# Patient Record
Sex: MALE | Race: WHITE | NOT HISPANIC OR LATINO | URBAN - METROPOLITAN AREA
[De-identification: names, ages, dates, MRNs, and addresses within clinical notes are randomized per-mention and may not be internally consistent; named-entity substitution may affect disease eponyms.]

---

## 2019-02-24 ENCOUNTER — EMERGENCY (EMERGENCY)
Facility: HOSPITAL | Age: 24
LOS: 0 days | Discharge: HOME | End: 2019-02-24
Attending: EMERGENCY MEDICINE | Admitting: EMERGENCY MEDICINE

## 2019-02-24 VITALS
HEIGHT: 68 IN | DIASTOLIC BLOOD PRESSURE: 73 MMHG | TEMPERATURE: 97 F | OXYGEN SATURATION: 99 % | HEART RATE: 84 BPM | RESPIRATION RATE: 18 BRPM | SYSTOLIC BLOOD PRESSURE: 141 MMHG | WEIGHT: 214.95 LBS
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
## 2019-02-24 DIAGNOSIS — T75.89XA OTHER SPECIFIED EFFECTS OF EXTERNAL CAUSES, INITIAL ENCOUNTER: ICD-10-CM
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
## 2019-02-24 LAB
ALBUMIN SERPL ELPH-MCNC: 4.3 G/DL — SIGNIFICANT CHANGE UP (ref 3.5–5.2)
ALP SERPL-CCNC: 65 U/L — SIGNIFICANT CHANGE UP (ref 30–115)
ALT FLD-CCNC: 21 U/L — SIGNIFICANT CHANGE UP (ref 0–41)
ANION GAP SERPL CALC-SCNC: 12 MMOL/L — SIGNIFICANT CHANGE UP (ref 7–14)
AST SERPL-CCNC: 16 U/L — SIGNIFICANT CHANGE UP (ref 0–41)
BASOPHILS # BLD AUTO: 0.05 K/UL — SIGNIFICANT CHANGE UP (ref 0–0.2)
BASOPHILS NFR BLD AUTO: 0.8 % — SIGNIFICANT CHANGE UP (ref 0–1)
BILIRUB SERPL-MCNC: 0.3 MG/DL — SIGNIFICANT CHANGE UP (ref 0.2–1.2)
BUN SERPL-MCNC: 16 MG/DL — SIGNIFICANT CHANGE UP (ref 10–20)
CALCIUM SERPL-MCNC: 9.1 MG/DL — SIGNIFICANT CHANGE UP (ref 8.5–10.1)
CHLORIDE SERPL-SCNC: 111 MMOL/L — HIGH (ref 98–110)
CO2 SERPL-SCNC: 24 MMOL/L — SIGNIFICANT CHANGE UP (ref 17–32)
CREAT SERPL-MCNC: 1 MG/DL — SIGNIFICANT CHANGE UP (ref 0.7–1.5)
EOSINOPHIL # BLD AUTO: 0.15 K/UL — SIGNIFICANT CHANGE UP (ref 0–0.7)
EOSINOPHIL NFR BLD AUTO: 2.3 % — SIGNIFICANT CHANGE UP (ref 0–8)
GLUCOSE SERPL-MCNC: 145 MG/DL — HIGH (ref 70–99)
HCT VFR BLD CALC: 45.1 % — SIGNIFICANT CHANGE UP (ref 42–52)
HGB BLD-MCNC: 15.1 G/DL — SIGNIFICANT CHANGE UP (ref 14–18)
HIV 1 & 2 AB SERPL IA.RAPID: SIGNIFICANT CHANGE UP
IMM GRANULOCYTES NFR BLD AUTO: 0.3 % — SIGNIFICANT CHANGE UP (ref 0.1–0.3)
LYMPHOCYTES # BLD AUTO: 1.96 K/UL — SIGNIFICANT CHANGE UP (ref 1.2–3.4)
LYMPHOCYTES # BLD AUTO: 29.8 % — SIGNIFICANT CHANGE UP (ref 20.5–51.1)
MCHC RBC-ENTMCNC: 30.3 PG — SIGNIFICANT CHANGE UP (ref 27–31)
MCHC RBC-ENTMCNC: 33.5 G/DL — SIGNIFICANT CHANGE UP (ref 32–37)
MCV RBC AUTO: 90.4 FL — SIGNIFICANT CHANGE UP (ref 80–94)
MONOCYTES # BLD AUTO: 0.51 K/UL — SIGNIFICANT CHANGE UP (ref 0.1–0.6)
MONOCYTES NFR BLD AUTO: 7.8 % — SIGNIFICANT CHANGE UP (ref 1.7–9.3)
NEUTROPHILS # BLD AUTO: 3.89 K/UL — SIGNIFICANT CHANGE UP (ref 1.4–6.5)
NEUTROPHILS NFR BLD AUTO: 59 % — SIGNIFICANT CHANGE UP (ref 42.2–75.2)
NRBC # BLD: 0 /100 WBCS — SIGNIFICANT CHANGE UP (ref 0–0)
PLATELET # BLD AUTO: 231 K/UL — SIGNIFICANT CHANGE UP (ref 130–400)
POTASSIUM SERPL-MCNC: 4.1 MMOL/L — SIGNIFICANT CHANGE UP (ref 3.5–5)
POTASSIUM SERPL-SCNC: 4.1 MMOL/L — SIGNIFICANT CHANGE UP (ref 3.5–5)
PROT SERPL-MCNC: 6.3 G/DL — SIGNIFICANT CHANGE UP (ref 6–8)
RBC # BLD: 4.99 M/UL — SIGNIFICANT CHANGE UP (ref 4.7–6.1)
RBC # FLD: 12.5 % — SIGNIFICANT CHANGE UP (ref 11.5–14.5)
SODIUM SERPL-SCNC: 147 MMOL/L — HIGH (ref 135–146)
WBC # BLD: 6.58 K/UL — SIGNIFICANT CHANGE UP (ref 4.8–10.8)
WBC # FLD AUTO: 6.58 K/UL — SIGNIFICANT CHANGE UP (ref 4.8–10.8)
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
## 2019-02-24 NOTE — ED PROVIDER NOTE - CLINICAL SUMMARY MEDICAL DECISION MAKING FREE TEXT BOX
Patient sustained possible body fluid exposure while at work no symptoms at this time I will discharge with follow up to ID I have offered pep prophylaxis patient does not want to pursue at this time I will discharge with follow up to ID

## 2019-02-24 NOTE — ED PROVIDER NOTE - CARE PROVIDER_API CALL
Alexander Barnes)  Infectious Disease; Internal Medicine  [address] [place] [address]  Phone: [phone]  Fax: [phone]  Follow Up Time:
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
## 2019-02-25 LAB
HAV IGM SER-ACNC: SIGNIFICANT CHANGE UP
HBV CORE IGM SER-ACNC: SIGNIFICANT CHANGE UP
HBV SURFACE AG SER-ACNC: SIGNIFICANT CHANGE UP
HCV AB S/CO SERPL IA: 0.08 S/CO — SIGNIFICANT CHANGE UP (ref 0–0.79)
HCV AB SERPL-IMP: SIGNIFICANT CHANGE UP
T PALLIDUM AB TITR SER: NEGATIVE — SIGNIFICANT CHANGE UP

## 2019-09-26 NOTE — ED PROVIDER NOTE - SCRIBE NAME
How Many Skin Cancers Have You Had?: more than one What Is The Reason For Today's Visit?: History of Non-Melanoma Skin Cancer Debi Calvo

## 2020-12-21 ENCOUNTER — EMERGENCY (EMERGENCY)
Facility: HOSPITAL | Age: 25
LOS: 0 days | Discharge: HOME | End: 2020-12-21
Attending: EMERGENCY MEDICINE | Admitting: EMERGENCY MEDICINE
Payer: OTHER MISCELLANEOUS

## 2020-12-21 VITALS
WEIGHT: 240.08 LBS | HEIGHT: 68 IN | RESPIRATION RATE: 22 BRPM | SYSTOLIC BLOOD PRESSURE: 141 MMHG | TEMPERATURE: 99 F | HEART RATE: 98 BPM | DIASTOLIC BLOOD PRESSURE: 89 MMHG | OXYGEN SATURATION: 99 %
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
## 2020-12-21 DIAGNOSIS — M25.561 PAIN IN RIGHT KNEE: ICD-10-CM

## 2020-12-21 DIAGNOSIS — M25.571 PAIN IN RIGHT ANKLE AND JOINTS OF RIGHT FOOT: ICD-10-CM
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
## 2020-12-21 DIAGNOSIS — M25.569 PAIN IN UNSPECIFIED KNEE: ICD-10-CM

## 2020-12-21 PROCEDURE — 73564 X-RAY EXAM KNEE 4 OR MORE: CPT | Mod: 26,RT

## 2020-12-21 PROCEDURE — 73630 X-RAY EXAM OF FOOT: CPT | Mod: 26,RT

## 2020-12-21 PROCEDURE — 73610 X-RAY EXAM OF ANKLE: CPT | Mod: 26,RT

## 2020-12-21 PROCEDURE — 99284 EMERGENCY DEPT VISIT MOD MDM: CPT

## 2020-12-21 RX ORDER — IBUPROFEN 200 MG
600 TABLET ORAL ONCE
Refills: 0 | Status: COMPLETED | OUTPATIENT
Start: 2020-12-21 | End: 2020-12-21

## 2020-12-21 RX ADMIN — Medication 600 MILLIGRAM(S): at 21:57

## 2020-12-21 NOTE — ED PROVIDER NOTE - OBJECTIVE STATEMENT
25 y.o. male no pmh presenting s/p mechanical fall presenting with right knee and ankle pain. Pt denies, head strike Ha, neck pain , LOC, blood thinner usage. Pt states he felt tired while working 17 hr shift, slipped down 2-3 stairs. Denies back pain, saddle anesthesia, gait abnormalities. 25 y.o. male no pmh presenting s/p mechanical fall presenting with right knee and ankle pain. Pt denies, head strike Ha, neck pain , LOC, blood thinner usage. Pt states he felt tired while working at  17 hr shift ([employer]), slipped down 2-3 stairs. Fell forward onto hands and knees, twisted right ankle. Jumped up immediately as was in the presence of inmates. Received ACE wrap from [facility].  Denies back pain, saddle anesthesia, gait abnormalities.

## 2020-12-21 NOTE — ED PROVIDER NOTE - PHYSICAL EXAMINATION
Physical Exam    Vital Signs: I have reviewed the initial vital signs.  Constitutional: well-nourished, appears stated age, no acute distress  Eyes: Conjunctiva pink, Sclera clear, PERRLA, EOMI.  Cardiovascular: S1 and S2, regular rate, regular rhythm, well-perfused extremities, radial pulses equal and 2+  Respiratory: unlabored respiratory effort, clear to auscultation bilaterally no wheezing, rales and rhonchi  Gastrointestinal: soft, non-tender abdomen, no pulsatile mass, normal bowl sounds  Musculoskeletal: supple neck, no lower extremity edema, no midline tenderness, no clavicular tenderness, no chest wall tenderness, no gross bony deformities of knee, no swelling noted, negative anterior drawer sign.   Integumentary: warm, dry, no rash  Neurologic: awake, alert, cranial nerves II-XII grossly intact, extremities’ motor and sensory functions grossly intact  Psychiatric: appropriate mood, appropriate affect Physical Exam    Vital Signs: I have reviewed the initial vital signs.  Constitutional: well-nourished, appears stated age, no acute distress  Eyes: Conjunctiva pink, Sclera clear, PERRLA, EOMI.  Cardiovascular: S1 and S2, regular rate, regular rhythm, well-perfused extremities, radial pulses equal and 2+  Respiratory: unlabored respiratory effort, clear to auscultation bilaterally no wheezing, rales and rhonchi  Gastrointestinal: soft, non-tender abdomen, no pulsatile mass, normal bowl sounds  Musculoskeletal: supple neck, no lower extremity edema, no midline tenderness, no clavicular tenderness, no chest wall tenderness, no gross bony deformities of knee, no swelling noted, negative anterior/posterior drawer signs, no laxity noted, pain with varus and valgus stress, no joint line tenderness, negative Jocelyne. Pedal pulses intact b/l. TTP right lateral malleoli. No swelling or gross bautista deformities.  Integumentary: warm, dry, no rash  Neurologic: awake, alert, cranial nerves II-XII grossly intact, extremities’ motor and sensory functions grossly intact, nonataxic gait.  Psychiatric: not assessed

## 2020-12-21 NOTE — ED PROVIDER NOTE - NSFOLLOWUPINSTRUCTIONS_ED_ALL_ED_FT
Follow up with the orthopedist as soon as possible. Return to the ER if your symptoms worsen.      Knee Pain    WHAT YOU NEED TO KNOW:    What do I need to know about knee pain? Knee pain may start suddenly, or it may be a long-term problem. You may have pain on the side, front, or back of your knee. You may have knee stiffness and swelling. You may hear popping sounds or feel like your knee is giving way or locking up as you walk. You may feel pain when you sit, stand, walk, or climb up and down stairs.    What increases my risk for knee pain?     Obesity      A strain or tear in ligaments or tendons      A leg fracture or knee dislocation      Overuse of your knee      Osteoarthritis, rheumatoid arthritis, or gout      An infection, tumor, or cyst in your knee      Shoes that are not supportive, or training on a hard surface      Sports that involve jumping or pivoting on your knee    How is the cause of knee pain diagnosed? Your healthcare provider will examine your knee and ask about your symptoms. Tell your provider when the pain started and what you were doing at the time. Describe the pain, such as sharp, throbbing, or achy. Tell your provider about any knee injury or surgery you had. You may need any of the following:     MRI, CT, or ultrasound pictures may show an injury, fracture, or tumor.       Blood tests may be used to check the level of inflammation in your blood. The tests may also show signs of infection.      Arthroscopy is a procedure to look inside your knee joint with an arthroscope. An arthroscope is a flexible tube with a light and camera on the end. A knee arthroscopy is usually done to check for disease or damage inside your knee. These problems may be fixed during the procedure.    How is knee pain treated? Treatment will depend on the cause of your pain. You may need any of the following:     NSAIDs help decrease swelling and pain or fever. This medicine is available with or without a doctor's order. NSAIDs can cause stomach bleeding or kidney problems in certain people. If you take blood thinner medicine, always ask your healthcare provider if NSAIDs are safe for you. Always read the medicine label and follow directions.      Acetaminophen decreases pain and fever. It is available without a doctor's order. Ask how much to take and how often to take it. Follow directions. Read the labels of all other medicines you are using to see if they also contain acetaminophen, or ask your doctor or pharmacist. Acetaminophen can cause liver damage if not taken correctly. Do not use more than 4 grams (4,000 milligrams) total of acetaminophen in one day.       Prescription pain medicine may be given. Ask your healthcare provider how to take this medicine safely. Some prescription pain medicines contain acetaminophen. Do not take other medicines that contain acetaminophen without talking to your healthcare provider. Too much acetaminophen may cause liver damage. Prescription pain medicine may cause constipation. Ask your healthcare provider how to prevent or treat constipation.       Steroid injections may be given into your knee. Steroids reduce inflammation and pain.      Surgery may be used for some injuries, such as to repair a torn ACL.    What can I do to manage my symptoms?     Rest your knee so it can heal. Limit activities that increase your pain. Do low-impact exercises, such as walking or swimming.       Apply ice to help reduce swelling and pain. Use an ice pack, or put crushed ice in a plastic bag. Cover it with a towel before you apply it to your knee. Apply ice for 15 to 20 minutes every hour, or as directed.      Apply compression to help reduce swelling. Use a brace or bandage only as directed.      Elevate your knee to help decrease pain and swelling. Elevate your knee while you are sitting or lying down. Prop your leg on pillows to keep your knee above the level of your heart.      Prevent your knee from moving as directed. Your healthcare provider may put on a cast or splint. You may need to wear a leg brace to stabilize your knee. A leg brace can be adjusted to increase your range of motion as your knee heals.Hinged Knee Braces          What can I do to prevent knee pain?     Maintain a healthy weight. Extra weight increases your risk for knee pain. Ask your healthcare provider how much you should weigh. He or she can help you create a safe weight loss plan if you need to lose weight.      Exercise or train properly. Use the correct equipment for sports. Wear shoes that provide good support. Check your posture often as you exercise, play sports, or train for an event. This can help prevent stress and strain on your knees. Rest between sessions so you do not overwork your knees.    When should I seek immediate care?     Your pain is worse, even after treatment.       You cannot bend or straighten your leg completely.       The swelling around your knee does not go down even with treatment.      Your knee is painful and hot to the touch.     When should I contact my healthcare provider?     You have questions or concerns about your condition or care.         CARE AGREEMENT:    You have the right to help plan your care. Learn about your health condition and how it may be treated. Discuss treatment options with your healthcare providers to decide what care you want to receive. You always have the right to refuse treatment.

## 2020-12-21 NOTE — ED PROVIDER NOTE - CLINICAL SUMMARY MEDICAL DECISION MAKING FREE TEXT BOX
25yM pw right knee pain right ankle pain since fall 230pm down 2 steps. no chi -  ambulating since -   mild swelling lateral malleolus  and ttp  5th metatarsal no crepitus  FROM ,  abrasion patella right  Contours of the joint are normal, mild  swelling, no crepitus to patella, , Full passive  flexion extension , anterior and posterior drawer sign negative with no laxity,  popliteal space no ecchymosis swelling or tenderness  xray no bony abnormality -  knee immobilizer  applied - , crutches - outpt ortho follow up

## 2020-12-21 NOTE — ED PROVIDER NOTE - NS ED ROS FT
Constitutional: (-) fever (-) chills (-) dizziness   Eyes/ENT: (-) blurry vision, (-) epistaxis  Cardiovascular: (-) chest pain, (-) syncope  Respiratory: (-) cough, (-) shortness of breath  Gastrointestinal: (-) vomiting, (-) diarrhea (-) nausea   Musculoskeletal: (-) neck pain, (-) back pain, (+) knee joint pain/ ankle pain   Integumentary: (-) rash, (-) edema  Neurological: (-) headache, (-) altered mental status  Psychiatric: (-) hallucinations  Allergic/Immunologic: (-) pruritus

## 2020-12-21 NOTE — ED PROVIDER NOTE - ADDITIONAL NOTES AND INSTRUCTIONS:
Do not ambulate without the knee immobilizer or crutches until you are evaluated by an Orthopedist and receive an MRI.

## 2020-12-21 NOTE — ED PROVIDER NOTE - PATIENT PORTAL LINK FT
You can access the FollowMyHealth Patient Portal offered by [facility] by registering at the following website: [email or web address]. By joining [facility]’s FollowMyHealth portal, you will also be able to view your health information using other applications (apps) compatible with our system.

## 2020-12-21 NOTE — ED PROVIDER NOTE - CARE PROVIDER_API CALL
Bernabe Grimm  ORTHOPAEDIC SURGERY  [address] [place] [address]  Phone: [phone]  Fax: [phone]  Follow Up Time:

## 2021-04-21 NOTE — ED ADULT NURSE NOTE - SUICIDE SCREENING QUESTION 3
Patient calling with questions/issues in regards to the following medication/s:   nortriptyline   Would like to know if the dosage can be stronger.      If patient would require a prescription, please us the following Pharmacy:     [id number] - [place], [place] - [address] AT [facility] [address]  [address] [place] [address]  Phone: [phone] Fax: [phone]       Please call patient at the following number:[phone] (home)   Patient states that it is okay to leave a detailed message.    Patient was informed that the patient would receive a phone call back within 24-48 hours unless this request is STAT.  
No

## 2022-12-11 PROBLEM — Z00.00 ENCOUNTER FOR PREVENTIVE HEALTH EXAMINATION: Status: ACTIVE | Noted: 2022-12-11

## 2022-12-12 ENCOUNTER — OUTPATIENT (OUTPATIENT)
Dept: OUTPATIENT SERVICES | Facility: HOSPITAL | Age: 27
LOS: 1 days | Discharge: HOME | End: 2022-12-12

## 2022-12-12 ENCOUNTER — RESULT REVIEW (OUTPATIENT)
Age: 27
End: 2022-12-12

## 2022-12-12 ENCOUNTER — APPOINTMENT (OUTPATIENT)
Dept: PODIATRY | Facility: CLINIC | Age: 27
End: 2022-12-12

## 2022-12-12 VITALS
DIASTOLIC BLOOD PRESSURE: 74 MMHG | HEART RATE: 69 BPM | OXYGEN SATURATION: 98 % | TEMPERATURE: 97.1 F | WEIGHT: 215 LBS | SYSTOLIC BLOOD PRESSURE: 112 MMHG | HEIGHT: 68 IN | BODY MASS INDEX: 32.58 KG/M2

## 2022-12-12 DIAGNOSIS — M79.672 PAIN IN LEFT FOOT: ICD-10-CM

## 2022-12-12 DIAGNOSIS — M21.611 BUNION OF RIGHT FOOT: ICD-10-CM

## 2022-12-12 DIAGNOSIS — M79.671 PAIN IN RIGHT FOOT: ICD-10-CM

## 2022-12-12 DIAGNOSIS — M21.612 BUNION OF RIGHT FOOT: ICD-10-CM

## 2022-12-12 PROCEDURE — 99203 OFFICE O/P NEW LOW 30 MIN: CPT

## 2022-12-12 PROCEDURE — 73630 X-RAY EXAM OF FOOT: CPT | Mod: 26,50

## 2022-12-12 NOTE — PHYSICAL EXAM
[Ankle Swelling (On Exam)] : not present [2+] : left foot dorsalis pedis 2+ [de-identified] : HAV B/L R>L. Limited ROM 1st MTPJ. Medial/dorsal eminence 1st Met head B/L, painful on palpation  [Skin Turgor] : normal skin turgor [Skin Lesions] : no skin lesions [Sensation] : the sensory exam was normal to light touch and pinprick [Motor Exam] : the motor exam was normal

## 2022-12-12 NOTE — ASSESSMENT
[FreeTextEntry1] : Rx Xray B/L feet\par Educated on proper shoe wear\par Discussed regarding surgical and conservative treatments. \par Pt will return back in 6 months and determine of surgical correction is the right option for him [Verbal] : verbal [Patient] : patient [Good - alert, interested, motivated] : Good - alert, interested, motivated [Demonstrates independently] : demonstrates independently [Foot Care] : foot care

## 2022-12-12 NOTE — HISTORY OF PRESENT ILLNESS
[Sneakers] : annalee [FreeTextEntry1] : B/L Bunions, painful \par - Long standings\par - Since he was 18 y/o\par - Getting worse over time\par - Pain with ambulation and shoe wear\par - Conservative treatment with orthotics has failed\par - pain at worst 8/10, R>L\par - Active, on his feet a lot

## 2023-04-05 ENCOUNTER — APPOINTMENT (OUTPATIENT)
Dept: ORTHOPEDIC SURGERY | Facility: CLINIC | Age: 28
End: 2023-04-05
Payer: OTHER MISCELLANEOUS

## 2023-04-05 ENCOUNTER — NON-APPOINTMENT (OUTPATIENT)
Age: 28
End: 2023-04-05

## 2023-04-05 VITALS — WEIGHT: 220 LBS | BODY MASS INDEX: 33.34 KG/M2 | HEIGHT: 68 IN

## 2023-04-05 PROCEDURE — 73610 X-RAY EXAM OF ANKLE: CPT | Mod: RT

## 2023-04-05 PROCEDURE — 99203 OFFICE O/P NEW LOW 30 MIN: CPT | Mod: ACP

## 2023-04-05 NOTE — DATA REVIEWED
[FreeTextEntry1] : X-ray images were obtained at the office today.  AP, lateral, oblique weightbearing views of the right ankle reveal no acute fractures, dislocations, bony abnormalities.

## 2023-04-05 NOTE — PHYSICAL EXAM
[de-identified] : Physical exam of the right ankle:\par -Lateral aspect of joint is mildly swollen.  Skin intact\par -TTP ATFL, PTFL, lateral malleolus.  Mild tenderness over deltoid ligaments.  No medial malleolus tenderness, anterior joint line tenderness, foot tenderness.\par -Patient has limited range of motion of the ankle due to pain and swelling\par -+2 dorsalis pedis pulse\par -Sensation intact to light touch

## 2023-04-05 NOTE — DISCUSSION/SUMMARY
[de-identified] : Patient has a ankle sprain of the right ankle.  Diagnosis and x-ray images were discussed with patient.  Patient was educated that ankle sprains may take up to 4 to 6 weeks to heal, and that swelling and bruising in the ankle/foot is normal.  Today, patient was given a tall cam walking boot to weight-bear as tolerated.  He was encouraged to rest at all times except for hygiene purposes, as well as to ice and alternate with warm water and Epsom salt soaks.  Patient was encouraged to take over-the-counter anti-inflammatories as needed for pain relief.  He was advised to rest as much as possible, and elevate the right lower extremity while resting.  He can use crutches as needed.  Patient will return to work on light duty until follow up.  Patient will follow-up in 3 to 4 weeks.  He agrees to above plan all questions were answered today.\par \par Supervising physician: Dr. Grimm

## 2023-04-05 NOTE — HISTORY OF PRESENT ILLNESS
[de-identified] : 27-year-old male presented with right ankle pain.  Yesterday, patient was at work, at [employer], and he fell down the stairs.  His ankle landed in an awkward position.  Since then he has had pain and swelling in the ankle.  Yesterday, patient went to [facility] urgent care were x-rays were taken and read as negative for fractures.  Patient has been taking ibuprofen as needed.  He has been ambulating with crutches and wearing a Darco shoe.  Patient has had an history of an ankle sprain of the right ankle years ago.  Denies any numbness and tingling.  Has not been working since injury.

## 2023-04-20 ENCOUNTER — NON-APPOINTMENT (OUTPATIENT)
Age: 28
End: 2023-04-20

## 2023-04-20 ENCOUNTER — APPOINTMENT (OUTPATIENT)
Dept: ORTHOPEDIC SURGERY | Facility: CLINIC | Age: 28
End: 2023-04-20
Payer: OTHER MISCELLANEOUS

## 2023-04-20 PROCEDURE — 73610 X-RAY EXAM OF ANKLE: CPT | Mod: RT

## 2023-04-20 PROCEDURE — 99213 OFFICE O/P EST LOW 20 MIN: CPT | Mod: ACP

## 2023-04-20 PROCEDURE — 73630 X-RAY EXAM OF FOOT: CPT | Mod: RT

## 2023-04-20 NOTE — HISTORY OF PRESENT ILLNESS
[de-identified] : 27-year-old male is here today for follow-up of his right ankle injury.  He fell down the stairs at work on April 4.  He is now a little over 2 weeks out from the injury.  He has been in a cam walker boot.  He has been weightbearing as tolerated.  He has been working light duty but he states he is still having a lot of pain and swelling in the ankle.  He has been taking ibuprofen with not much relief.  He denies any new injury or trauma states he is starting to have some pain to the side of his foot.  He denies any numbness tingling or any calf pain.

## 2023-04-20 NOTE — IMAGING
[de-identified] : On examination of his right ankle and foot he has moderate swelling, no erythema, no ecchymosis.  He is tender over the distal fibula and the lateral malleolus.  He is tender over the ATFL and CFL.  He is nontender over the medial malleolus or the deltoid ligament.  He is nontender over the talar dome.  He is nontender over the Achilles or the calcaneus, negative Neely's test, no calf tenderness.  Mild tenderness over the lateral aspect of the foot over the fourth and fifth metatarsals.  No tenderness over the Lisfranc joint.  He is able to dorsiflex and plantarflex but has some decreased range of motion, decreased range of motion with inversion and eversion.  Sensation is intact throughout, 2+ DP and PT pulses\par \par Jeevan x-rays taken in the office today of the right ankle and right foot, weightbearing, show questionable lucency in the distal fibular shaft suspicious for nondisplaced fracture.  No widening of the medial clear space or the syndesmosis.  No fractures or bony abnormalities noted in the right foot.

## 2023-04-20 NOTE — DISCUSSION/SUMMARY
[de-identified] : At this time I would like him to remain in the cam walker boot, he can continue weightbearing as tolerated.  Like to get an MRI of his ankle to confirm if the x-ray findings are fracture as well as to evaluate for any ligament injury.  I would like him to rest the area so we will keep him out of work for now.  Rest, ice, elevate, Tylenol or Motrin as needed for pain.  I will see him back in a few weeks for repeat evaluation, he can call me after the MRI to go over the results. Patient will call me if any other problems or concerns.  Patient verbalized understanding and agreed with the plan, all questions were answered in the office today.\par \par This is a Worker's Compensation case, he is unable to work at this time.  He is currently on temporary total disability.

## 2023-05-09 ENCOUNTER — NON-APPOINTMENT (OUTPATIENT)
Age: 28
End: 2023-05-09

## 2023-05-09 ENCOUNTER — APPOINTMENT (OUTPATIENT)
Dept: ORTHOPEDIC SURGERY | Facility: CLINIC | Age: 28
End: 2023-05-09
Payer: OTHER MISCELLANEOUS

## 2023-05-09 VITALS — BODY MASS INDEX: 33.34 KG/M2 | WEIGHT: 220 LBS | HEIGHT: 68 IN

## 2023-05-09 PROCEDURE — L1902: CPT | Mod: RT

## 2023-05-09 PROCEDURE — 99214 OFFICE O/P EST MOD 30 MIN: CPT

## 2023-05-09 NOTE — DATA REVIEWED
[FreeTextEntry1] : I reviewed the patient's MRI that was done at [facility].  The MRI demonstrates evidence of a deltoid ligament tear along with a ATFL and CFL tear.  There is a low-lying peroneus brevis muscle belly.

## 2023-05-09 NOTE — DISCUSSION/SUMMARY
[de-identified] : I discussed the patient's findings with him.  I think the patient at this time would benefit from a course of physical therapy along with transitioning to a lace up ankle brace.  I will see him back in a month for repeat clinical exam.  He will remain 100% temporarily disabled.

## 2023-05-09 NOTE — HISTORY OF PRESENT ILLNESS
[de-identified] : This is a 27-year-old male patient here for follow-up of his right ankle sprain.  He is seeing me for the first time.  He reports that he tried to go back to work however the pain was unable to be tolerated and as such could not return.  He is wearing a boot.  He has not yet started physical therapy.  Eyes any interval trauma.

## 2023-05-09 NOTE — IMAGING
[de-identified] : Is alert 1x3.  He is pleasant and cooperative that exam.  I examined his right lower extremity.  He remains swollen compared to the contralateral side.  The calf is soft.  Compartment soft compressible.  Negative Homans' sign.  Pain with inversion and plantarflexion.  Neurovascular intact distally.

## 2023-05-11 ENCOUNTER — APPOINTMENT (OUTPATIENT)
Dept: ORTHOPEDIC SURGERY | Facility: CLINIC | Age: 28
End: 2023-05-11

## 2023-05-14 ENCOUNTER — FORM ENCOUNTER (OUTPATIENT)
Age: 28
End: 2023-05-14

## 2023-06-02 ENCOUNTER — NON-APPOINTMENT (OUTPATIENT)
Age: 28
End: 2023-06-02

## 2023-06-02 ENCOUNTER — APPOINTMENT (OUTPATIENT)
Dept: ORTHOPEDIC SURGERY | Facility: CLINIC | Age: 28
End: 2023-06-02
Payer: OTHER MISCELLANEOUS

## 2023-06-02 VITALS — HEIGHT: 68 IN | WEIGHT: 220 LBS | BODY MASS INDEX: 33.34 KG/M2

## 2023-06-02 PROCEDURE — 99213 OFFICE O/P EST LOW 20 MIN: CPT

## 2023-06-02 NOTE — DISCUSSION/SUMMARY
[de-identified] : I would like the patient to start transitioning out of the boot.  He should wear the brace primarily with a sneaker and do physical therapy outside the brace.  He will continue physical therapy.  I will see him back in 4 weeks.  He remains percent temporarily disabled.

## 2023-06-02 NOTE — PHYSICAL EXAM
[de-identified] : He remains quite tender over the lateral aspect of his ankle.  Swelling has been reduced.  His skin is intact.  Full range of motion.  Stable ankle.  Neurovascular intact distally.

## 2023-06-02 NOTE — HISTORY OF PRESENT ILLNESS
[de-identified] : Patient comes in today for follow-up of his right ankle sprain.  He is still having significant pain.  He was able to start physical therapy.  He has had 4 sessions so far.  He is improving somewhat but he still localizes pain mostly laterally.  He is wearing the brace as well as the boot.

## 2023-06-05 ENCOUNTER — RX RENEWAL (OUTPATIENT)
Age: 28
End: 2023-06-05

## 2023-06-30 ENCOUNTER — APPOINTMENT (OUTPATIENT)
Dept: ORTHOPEDIC SURGERY | Facility: CLINIC | Age: 28
End: 2023-06-30
Payer: OTHER MISCELLANEOUS

## 2023-06-30 ENCOUNTER — NON-APPOINTMENT (OUTPATIENT)
Age: 28
End: 2023-06-30

## 2023-06-30 PROCEDURE — 99213 OFFICE O/P EST LOW 20 MIN: CPT

## 2023-06-30 NOTE — PHYSICAL EXAM
[de-identified] : He is alert oriented x3.  Pleasant cooperative that exam.  I examined his right lower extremity.  He is tender palpation over the anterolateral aspect of the ankle.  This is less so than prior exams.  His ankle is stable.  Full range of motion.  Neurovascular intact.

## 2023-06-30 NOTE — DISCUSSION/SUMMARY
[de-identified] : I agree with Luis Alberto that he is ready to return to work.  He will benefit from a couple sessions of physical therapy prior to returning.  He is anticipating return to the workday of July 12 which I think is appropriate.  I will see him back on a as needed basis.  All questions answered.

## 2023-06-30 NOTE — HISTORY OF PRESENT ILLNESS
[de-identified] : 27-year-old patient comes in today for follow-up of his right ankle.  He is deftly doing better.  He is doing physical therapy.  He is improving.  He has less pain.

## 2023-07-17 ENCOUNTER — RX RENEWAL (OUTPATIENT)
Age: 28
End: 2023-07-17

## 2023-07-17 RX ORDER — MELOXICAM 15 MG/1
15 TABLET ORAL
Qty: 30 | Refills: 0 | Status: ACTIVE | COMMUNITY
Start: 2023-05-09 | End: 1900-01-01

## 2023-08-22 ENCOUNTER — NON-APPOINTMENT (OUTPATIENT)
Age: 28
End: 2023-08-22

## 2023-09-08 ENCOUNTER — APPOINTMENT (OUTPATIENT)
Dept: ORTHOPEDIC SURGERY | Facility: CLINIC | Age: 28
End: 2023-09-08
Payer: OTHER MISCELLANEOUS

## 2023-09-08 DIAGNOSIS — S93.491A SPRAIN OF OTHER LIGAMENT OF RIGHT ANKLE, INITIAL ENCOUNTER: ICD-10-CM

## 2023-09-08 PROCEDURE — 99213 OFFICE O/P EST LOW 20 MIN: CPT

## 2023-09-10 PROBLEM — S93.491A SPRAIN OF OTHER LIGAMENT OF RIGHT ANKLE, INITIAL ENCOUNTER: Status: ACTIVE | Noted: 2023-04-05

## 2023-09-10 NOTE — PHYSICAL EXAM
[de-identified] : Examination of his right lower extremity was undertaken.  He has no tenderness to palpation anywhere along the ankle.  He demonstrates full range of motion.  His ankle is stable.  He is neurovascular intact distally.

## 2023-09-10 NOTE — DISCUSSION/SUMMARY
[de-identified] : I discussed the patient's findings with him.  His exam is completely benign.  Given that he has no pain the patient's ankle sprain is now considered completely resolved.  He has no restrictions going forward.  I will see him back in as-needed basis.

## 2023-09-10 NOTE — HISTORY OF PRESENT ILLNESS
[de-identified] : 27-year-old patient here for follow-up of his right ankle sprain.  He is now essentially pain-free.  He has no difficulty walking.  He has been exercising without difficulty.

## 2023-09-11 ENCOUNTER — NON-APPOINTMENT (OUTPATIENT)
Age: 28
End: 2023-09-11

## 2024-10-30 ENCOUNTER — NON-APPOINTMENT (OUTPATIENT)
Age: 29
End: 2024-10-30